# Patient Record
Sex: FEMALE | Race: BLACK OR AFRICAN AMERICAN | NOT HISPANIC OR LATINO | Employment: FULL TIME | URBAN - METROPOLITAN AREA
[De-identification: names, ages, dates, MRNs, and addresses within clinical notes are randomized per-mention and may not be internally consistent; named-entity substitution may affect disease eponyms.]

---

## 2021-09-10 ENCOUNTER — TELEPHONE (OUTPATIENT)
Dept: INTERNAL MEDICINE | Facility: CLINIC | Age: 51
End: 2021-09-10

## 2021-09-10 NOTE — TELEPHONE ENCOUNTER
Left VM to call office, she does not have a mychart set up so the only way to get her immunization records would be to mail or fax.

## 2021-09-10 NOTE — TELEPHONE ENCOUNTER
Caller: Jordin Ortiz    Relationship: Self    Best call back number: [phone]    What form or medical record are you requesting: PROOF OF TDAP 09/17/2013    Who is requesting this form or medical record from you: PATIENT / EMPLOYER     How would you like to receive the form or medical records (pick-up, mail, fax): UPLOAD TO [employer] (PATIENT IS NO LONGER LOCAL)    Timeframe paperwork needed: ASAP    Additional notes: PATIENT NEEDS PROOF OF HER TDAP FOR EMPLOYMENT. SHE NEEDS IT ASAP AND DOES NOT WANT TO WAIT FOR THE MAIL, PLEASE UPLOAD ON HER [employer]/ ANY QUESTIONS AND/OR CONCERNS CALL PATIENT [phone]